# Patient Record
Sex: MALE | Race: WHITE | NOT HISPANIC OR LATINO
[De-identification: names, ages, dates, MRNs, and addresses within clinical notes are randomized per-mention and may not be internally consistent; named-entity substitution may affect disease eponyms.]

---

## 2023-08-28 ENCOUNTER — APPOINTMENT (OUTPATIENT)
Dept: ORTHOPEDIC SURGERY | Facility: CLINIC | Age: 63
End: 2023-08-28
Payer: COMMERCIAL

## 2023-08-28 ENCOUNTER — NON-APPOINTMENT (OUTPATIENT)
Age: 63
End: 2023-08-28

## 2023-08-28 DIAGNOSIS — M75.52 BURSITIS OF LEFT SHOULDER: ICD-10-CM

## 2023-08-28 DIAGNOSIS — M75.82 OTHER SHOULDER LESIONS, LEFT SHOULDER: ICD-10-CM

## 2023-08-28 DIAGNOSIS — M77.12 LATERAL EPICONDYLITIS, LEFT ELBOW: ICD-10-CM

## 2023-08-28 PROBLEM — Z00.00 ENCOUNTER FOR PREVENTIVE HEALTH EXAMINATION: Status: ACTIVE | Noted: 2023-08-28

## 2023-08-28 PROCEDURE — 99204 OFFICE O/P NEW MOD 45 MIN: CPT | Mod: 25

## 2023-08-28 PROCEDURE — 73030 X-RAY EXAM OF SHOULDER: CPT | Mod: LT

## 2023-08-28 PROCEDURE — 20611 DRAIN/INJ JOINT/BURSA W/US: CPT | Mod: LT

## 2023-08-28 NOTE — HISTORY OF PRESENT ILLNESS
[3] : a current pain level of 3/10 [5] : an average pain level of 5/10 [de-identified] : RIGHT SHOULDER AND BICEP DISCOMFORT (CONSTANT), ELBOW PAIN (MILD)  STARTED 3-4 MONTHS AGO - APRIL 2023 NO SPECIFIC INJURY  5/10 WHEN IT HURTS PAIN IN LATERAL SHOUDLER AND ARM RADIATES DOWN ARM TO ELBOW AND  FOREARM  - AND UP NECK  SENT FOR NEW XRAYS

## 2023-08-28 NOTE — PHYSICAL EXAM
[de-identified] : PHYSICAL EXAM LEFT  SHOULDER  NECK EXAM  FROM NONTENDER  SPURLING  RIGHT=NEG, LEFT=NEG  NORMAL POSTURE  AROM 140 / 140 / 90 / 30 TENDER: SA REGION  LATERAL   SPECIAL TESTING : STEINER - POSITIVE  ANGY - POSITIVE  SPEED TEST - POSITIVE  INIGUEZ - NEGATIVE  APPREHENSION AND SUPPRESSION - NEGATIVE   RC STRENGTH TESTING  SS:  5/5 SUB 5/5 IS     5/5 BICEPS  5/5  SENSATION  - GROSSLY INTACT   [de-identified] : LEFT SHOULDER XRAY (2 VIEWS - AP AND OUTLET) -   NO OBVIOUS FRACTURE , SEPARATION OR DISLOCATION  NO SIGNIFICANT OSTEOARTHRITIS,  TYPE 2B ACROMION  CSA=22     DIAGNOSTIC ULTRASOUND LEFT SHOULDER  DIAGNOSTIC SONOGRAPHY of the Rotator Cuff Soft Tissue of the LEFT  SHOULDER was performed in Multiple Scan Planes with varying transducer frequencies. Imaging of the Supraspinatus Tendon reveals TENDONITIS, BURSITIS  WITH OUT SIGNIFICANT / COMPLETE TEAR Imaging of the Biceps Tendon reveals no significant tear. Imaging of the Subscapularis Tendon reveals no significant tear. Imaging of the Infraspinatus Tendon reveals no significant tear. Key images were save digitally and reviewed with patient.

## 2023-08-28 NOTE — DISCUSSION/SUMMARY
[de-identified] : ULTRASOUND EVALUATION  REVEALS INFLAMMATORY CHANGES WITHOUT SIGNIFICANT TEAR  PATIENT HAS ELECTED TO PROCEED WITH KENALOG INJECTION SHOULDER  RISKS AND BENEFITS DISCUSSED - VERBAL CONSENT OBTAINED  SEE PROCEDURE NOTE   POST INJECTION INSTRUCTIONS:  INJECTION THERAPY HANDOUT PROVIDED  COLD THERAPY , ANALGESICS PRN  HOME  EXERCISES QD - PENDULUM AND ROM  HANDOUT PROVIDED, REVIEWED AND DEMONSTRATED - REFERRED TO INSTRUCTIONAL VIDEO ON MY WEBSITE  START P.T.  WITHIN 2 WEEKS AFTER INJECTION - 2 X 4 WEEKS   MRI IF NO RELIEF 12 WEEKS

## 2023-08-28 NOTE — PROCEDURE
[de-identified] : INJECTION LEFT SHOULDER SA SPACE  Patient has demonstrated limited relief from NSAIDS, rest, exercises / PT, and after discussion of the risks and benefits, the patient has elected to proceed with an ULTRASOUND GUIDED injection into the LEFT SUBACROMIAL  SPACE LATERAL APPROACH    Confirmed that the patient does not have history of prior adverse reactions, active, infections, or relevant allergies. There was no effusion, erythema, or warmth, and the skin was clear  The skin was sterilized with alcohol. Ethyl Chloride was used as a topical anesthetic. Routine sterile technique.  The site was injected UTILIZING ULTRASOUND GUIDANCE to confirm appropriate placement of the needle- with a mixture of medication and local anesthetic. The injection was completed without complication and a bandage was applied.   The patient tolerated the procedure well and was given post-injection instructions.Rec: Cold therapy, analgesics, avoid heavy activity. MEDICATION: 4cc of 1% xylocaine + 40mg of KENALOG LOT # [id number]  EXP 07/24